# Patient Record
Sex: MALE | Race: WHITE | Employment: FULL TIME | URBAN - METROPOLITAN AREA
[De-identification: names, ages, dates, MRNs, and addresses within clinical notes are randomized per-mention and may not be internally consistent; named-entity substitution may affect disease eponyms.]

---

## 2017-05-18 DIAGNOSIS — F32.A ANXIETY AND DEPRESSION: ICD-10-CM

## 2017-05-18 DIAGNOSIS — F41.9 ANXIETY AND DEPRESSION: ICD-10-CM

## 2017-05-18 RX ORDER — VENLAFAXINE HYDROCHLORIDE 150 MG/1
CAPSULE, EXTENDED RELEASE ORAL
Qty: 90 CAP | Refills: 0 | Status: SHIPPED | COMMUNITY
Start: 2017-05-18 | End: 2017-08-16 | Stop reason: SDUPTHER

## 2020-01-06 ENCOUNTER — OFFICE VISIT (OUTPATIENT)
Dept: FAMILY MEDICINE CLINIC | Age: 45
End: 2020-01-06

## 2020-01-06 VITALS
OXYGEN SATURATION: 98 % | DIASTOLIC BLOOD PRESSURE: 100 MMHG | WEIGHT: 257 LBS | SYSTOLIC BLOOD PRESSURE: 164 MMHG | TEMPERATURE: 98.4 F | RESPIRATION RATE: 18 BRPM | BODY MASS INDEX: 34.06 KG/M2 | HEIGHT: 73 IN | HEART RATE: 118 BPM

## 2020-01-06 DIAGNOSIS — R03.0 ELEVATED BLOOD PRESSURE READING: Primary | ICD-10-CM

## 2020-01-06 DIAGNOSIS — Z13.6 SCREENING FOR CARDIOVASCULAR CONDITION: ICD-10-CM

## 2020-01-06 RX ORDER — QUETIAPINE FUMARATE 25 MG/1
TABLET, FILM COATED ORAL
COMMUNITY
Start: 2019-12-02

## 2020-01-06 RX ORDER — VENLAFAXINE HYDROCHLORIDE 75 MG/1
CAPSULE, EXTENDED RELEASE ORAL
COMMUNITY
Start: 2019-12-02

## 2020-01-06 RX ORDER — LAMOTRIGINE 100 MG/1
TABLET ORAL
COMMUNITY
Start: 2019-11-07

## 2020-01-06 NOTE — PROGRESS NOTES
Chief Complaint   Patient presents with    Well Male     1. Have you been to the ER, urgent care clinic since your last visit? Hospitalized since your last visit?no    2. Have you seen or consulted any other health care providers outside of the [address] [place] [address] since your last visit? Include any pap smears or colon screening.  no  Okay to delete medications no longer taking/therapy completed per verbal order Trenda Hatchet NP-C

## 2020-01-06 NOTE — PROGRESS NOTES
HISTORY OF PRESENT ILLNESS    Luis Armando Laws is a 45y.o. year old male who comes in today as a new patient to our practice to reestablish care. Patient states he has not been seen since by a primary care provider since 2016. HPI:   Patient reports he would like to have labs drawn. Comments has does have increase in work related stressors and also reports he has gained approx 15lbs over the holiday season. comments he and his wife are committed to changing their diet and bought healthier foods last week. Patient does attempt to incorporate physical activity throughout his day by walking his dog at least once during his work day. Current Outpatient Medications   Medication Sig Dispense Refill    lamoTRIgine (LAMICTAL) 100 mg tablet       QUEtiapine (SEROQUEL) 25 mg tablet       venlafaxine-SR (EFFEXOR-XR) 75 mg capsule       venlafaxine-SR (EFFEXOR-XR) 150 mg capsule TAKE 1 CAPSULE DAILY 90 Cap 1     Past Medical History:   Diagnosis Date    ADHD     Anxiety     Depression      Family History   Problem Relation Age of Onset    Cancer Father         melanoma    Thyroid Disease Sister      Social History     Socioeconomic History    Marital status: UNKNOWN     Spouse name: Not on file    Number of children: Not on file    Years of education: Not on file    Highest education level: Not on file   Tobacco Use    Smoking status: Never Smoker    Smokeless tobacco: Never Used   Substance and Sexual Activity    Alcohol use: Yes     Alcohol/week: 0.0 standard drinks    Drug use: No       ROS:  Review of Systems   Constitutional: Negative for chills and fever. Respiratory: Negative for shortness of breath. Cardiovascular: Negative for chest pain and palpitations. Neurological: Negative for dizziness and headaches.            Objective:  Visit Vitals  BP (!) 158/102 (BP 1 Location: Right arm, BP Patient Position: Sitting)   Pulse (!) 118   Temp 98.4 °F (36.9 °C) (Oral)   Resp 18   Ht 6' 1\" (1.854 m) Wt 257 lb (116.6 kg)   SpO2 98%   BMI 33.91 kg/m²     Physical Exam  Constitutional:       General: He is not in acute distress. Appearance: He is well-developed. He is not diaphoretic. HENT:      Head: Normocephalic and atraumatic. Neck:      Musculoskeletal: Normal range of motion and neck supple. Vascular: No carotid bruit. Cardiovascular:      Rate and Rhythm: Normal rate and regular rhythm. Heart sounds: Normal heart sounds. No murmur. No friction rub. No gallop. Pulmonary:      Effort: Pulmonary effort is normal.      Breath sounds: Normal breath sounds. No stridor. No wheezing or rales. Lymphadenopathy:      Cervical: No cervical adenopathy. Skin:     General: Skin is warm and dry. Neurological:      Mental Status: He is alert and oriented to person, place, and time. Assessment/Plan:     ICD-10-CM ICD-9-CM    1. Elevated blood pressure reading R03.0 796.2    2. Screening for cardiovascular condition S66.2 J71.3 METABOLIC PANEL, COMPREHENSIVE      LIPID PANEL      HEMOGLOBIN A1C WITH EAG     Orders Placed This Encounter    METABOLIC PANEL, COMPREHENSIVE    LIPID PANEL    HEMOGLOBIN A1C WITH EAG    lamoTRIgine (LAMICTAL) 100 mg tablet    QUEtiapine (SEROQUEL) 25 mg tablet    venlafaxine-SR (EFFEXOR-XR) 75 mg capsule       Advised of low sodium diet  I have discussed the diagnosis with the patient and the intended plan as seen in the above orders. The patient has received an after-visit summary and questions were answered concerning future plans. I have discussed medication side effects and warnings with the patient as well. Follow-up and Dispositions    · Return in about 2 weeks (around 1/20/2020) for elevated blood pressure.

## 2020-01-06 NOTE — PATIENT INSTRUCTIONS
Elevated Blood Pressure: Care Instructions  Your Care Instructions    Blood pressure is a measure of how hard the blood pushes against the walls of your arteries. It's normal for blood pressure to go up and down throughout the day. But if it stays up over time, you have high blood pressure. Two numbers tell you your blood pressure. The first number is the systolic pressure. It shows how hard the blood pushes when your heart is pumping. The second number is the diastolic pressure. It shows how hard the blood pushes between heartbeats, when your heart is relaxed and filling with blood. An ideal blood pressure in adults is less than 120/80 (say \"120 over 80\"). High blood pressure is 140/90 or higher. You have high blood pressure if your top number is 140 or higher or your bottom number is 90 or higher, or both. The main test for high blood pressure is simple, fast, and painless. To diagnose high blood pressure, your doctor will test your blood pressure at different times. After testing your blood pressure, your doctor may ask you to test it again when you are home. If you are diagnosed with high blood pressure, you can work with your doctor to make a long-term plan to manage it. Follow-up care is a key part of your treatment and safety. Be sure to make and go to all appointments, and call your doctor if you are having problems. It's also a good idea to know your test results and keep a list of the medicines you take. How can you care for yourself at home? · Do not smoke. Smoking increases your risk for heart attack and stroke. If you need help quitting, talk to your doctor about stop-smoking programs and medicines. These can increase your chances of quitting for good. · Stay at a healthy weight. · Try to limit how much sodium you eat to less than 2,300 milligrams (mg) a day. Your doctor may ask you to try to eat less than 1,500 mg a day. · Be physically active.  Get at least 30 minutes of exercise on most days of the week. Walking is a good choice. You also may want to do other activities, such as running, swimming, cycling, or playing tennis or team sports. · Avoid or limit alcohol. Talk to your doctor about whether you can drink any alcohol. · Eat plenty of fruits, vegetables, and low-fat dairy products. Eat less saturated and total fats. · Learn how to check your blood pressure at home. When should you call for help? Call your doctor now or seek immediate medical care if:  ? · Your blood pressure is much higher than normal (such as 180/110 or higher). ? · You think high blood pressure is causing symptoms such as:  ¨ Severe headache. ¨ Blurry vision. ? Watch closely for changes in your health, and be sure to contact your doctor if:  ? · You do not get better as expected. Where can you learn more? Go to http://michaelaSTATS Grouplakeshia.info/. Enter D905 in the search box to learn more about \"Elevated Blood Pressure: Care Instructions. \"  Current as of: September 21, 2016  Content Version: 11.4  © 4850-0166 [employer]. Care instructions adapted under license by [employer] (which disclaims liability or warranty for this information). If you have questions about a medical condition or this instruction, always ask your healthcare professional. [address] any warranty or liability for your use of this information. Low Sodium Diet (2,000 Milligram): Care Instructions  Your Care Instructions    Too much sodium causes your body to hold on to extra water. This can raise your blood pressure and force your heart and kidneys to work harder. In very serious cases, this could cause you to be put in the hospital. It might even be life-threatening. By limiting sodium, you will feel better and lower your risk of serious problems. The most common source of sodium is salt. People get most of the salt in their diet from canned, prepared, and packaged foods.  Fast food and restaurant meals also are very high in sodium. Your doctor will probably limit your sodium to less than 2,000 milligrams (mg) a day. This limit counts all the sodium in prepared and packaged foods and any salt you add to your food. Follow-up care is a key part of your treatment and safety. Be sure to make and go to all appointments, and call your doctor if you are having problems. It's also a good idea to know your test results and keep a list of the medicines you take. How can you care for yourself at home? Read food labels  · Read labels on cans and food packages. The labels tell you how much sodium is in each serving. Make sure that you look at the serving size. If you eat more than the serving size, you have eaten more sodium. · Food labels also tell you the Percent Daily Value for sodium. Choose products with low Percent Daily Values for sodium. · Be aware that sodium can come in forms other than salt, including monosodium glutamate (MSG), sodium citrate, and sodium bicarbonate (baking soda). MSG is often added to Asian food. When you eat out, you can sometimes ask for food without MSG or added salt. Buy low-sodium foods  · Buy foods that are labeled \"unsalted\" (no salt added), \"sodium-free\" (less than 5 mg of sodium per serving), or \"low-sodium\" (less than 140 mg of sodium per serving). Foods labeled \"reduced-sodium\" and \"light sodium\" may still have too much sodium. Be sure to read the label to see how much sodium you are getting. · Buy fresh vegetables, or frozen vegetables without added sauces. Buy low-sodium versions of canned vegetables, soups, and other canned goods. Prepare low-sodium meals  · Cut back on the amount of salt you use in cooking. This will help you adjust to the taste. Do not add salt after cooking. One teaspoon of salt has about 2,300 mg of sodium. · Take the salt shaker off the table. · Flavor your food with garlic, lemon juice, onion, vinegar, herbs, and spices.  Do not use soy sauce, lite soy sauce, steak sauce, onion salt, garlic salt, celery salt, mustard, or ketchup on your food. · Use low-sodium salad dressings, sauces, and ketchup. Or make your own salad dressings and sauces without adding salt. · Use less salt (or none) when recipes call for it. You can often use half the salt a recipe calls for without losing flavor. Other foods such as rice, pasta, and grains do not need added salt. · Rinse canned vegetables, and cook them in fresh water. This removes somebut not allof the salt. · Avoid water that is naturally high in sodium or that has been treated with water softeners, which add sodium. Call your local water company to find out the sodium content of your water supply. If you buy bottled water, read the label and choose a sodium-free brand. Avoid high-sodium foods  · Avoid eating:  ? Smoked, cured, salted, and canned meat, fish, and poultry. ? Ham, umana, hot dogs, and luncheon meats. ? Regular, hard, and processed cheese and regular peanut butter. ? Crackers with salted tops, and other salted snack foods such as pretzels, chips, and salted popcorn. ? Frozen prepared meals, unless labeled low-sodium. ? Canned and dried soups, broths, and bouillon, unless labeled sodium-free or low-sodium. ? Canned vegetables, unless labeled sodium-free or low-sodium. ? [place] fries, pizza, tacos, and other fast foods. ? Pickles, olives, ketchup, and other condiments, especially soy sauce, unless labeled sodium-free or low-sodium. Where can you learn more? Go to http://michaela-lakeshia.info/. Enter M626 in the search box to learn more about \"Low Sodium Diet (2,000 Milligram): Care Instructions. \"  Current as of: November 7, 2018  Content Version: 12.2  © 3518-3493 [employer]. Care instructions adapted under license by [employer] (which disclaims liability or warranty for this information).  If you have questions about a medical condition or this instruction, always ask your healthcare professional. [address] any warranty or liability for your use of this information. How to Read a Food Label to Limit Sodium: Care Instructions  Your Care Instructions  Sodium causes your body to hold on to extra water. This can raise your blood pressure and force your heart and kidneys to work harder. In very serious cases, this could cause you to be put in the hospital. It might even be life-threatening. By limiting sodium, you will feel better and lower your risk of serious problems. Processed foods, fast food, and restaurant foods are the major sources of dietary sodium. The most common name for sodium is salt. Try to limit how much sodium you eat to less than 2,300 milligrams (mg) a day. If you limit your sodium to 1,500 mg a day, you can lower your blood pressure even more. This limit counts all the salt that you eat in foods you cook or in packaged foods. Keep a list of everything you eat and drink. Follow-up care is a key part of your treatment and safety. Be sure to make and go to all appointments, and call your doctor if you are having problems. It's also a good idea to know your test results and keep a list of the medicines you take. How can you care for yourself at home? Read ingredient lists on food labels  · Read the list of ingredients on food labels to help you find how much sodium is in a food. The label lists the ingredients in a food in descending order (from the most to the least). If salt or sodium is high on the list, there may be a lot of sodium in the food. · Know that sodium has different names. Sodium is also called monosodium glutamate (MSG, common in [facility] food), sodium citrate, sodium alginate, sodium hydroxide, and sodium phosphate. Read Nutrition Facts labels  · On most foods, there is a Nutrition Facts label. This will tell you how much sodium is in one serving of food.  Look at both the serving size and the sodium amount. The serving size is located at the top of the label, usually right under the \"Nutrition Facts\" title. The amount of sodium is given in the list under the title. It is given in milligrams (mg). ? Check the serving size carefully. A single serving is often very small, and you may eat more than one serving. If this is the case, you will eat more sodium than listed on the label. For example, if the serving size for a canned soup is 1 cup and the sodium amount is 470 mg, if you have 2 cups you will eat 940 mg of sodium. · The nutrition facts for fresh fruits and vegetables are not listed on the food. They may be listed somewhere in the store. These foods usually have no sodium or low sodium. · The Nutrition Facts label also gives you the Percent Daily Value for sodium. This is how much of the recommended amount of sodium a serving contains. The daily value for sodium is less than 2,300 mg. So if the Percent Daily Value says 50%, this means one serving is giving you half of this, or 1,150 mg. Buy low-sodium foods  · Look for foods that are made with less sodium. Watch for the following words on the label. ? \"Unsalted\" means there is no sodium added to the food. But there may be sodium already in the food naturally. ? \"Sodium-free\" means a serving has less than 5 mg of sodium. ? \"Very low sodium\" means a serving has 35 mg or less of sodium. ? \"Low-sodium\" means a serving has 140 mg or less of sodium. · \"Reduced-sodium\" means that there is 25% less sodium than what the food normally has. This is still usually too much sodium. Try not to buy foods with this on the label. · Buy fresh vegetables, or frozen vegetables without added sauces. Buy low-sodium versions of canned vegetables, soups, and other canned goods. Where can you learn more? Go to http://antione.info/.   Enter 26 [id number] in the search box to learn more about \"How to Read a Food Label to Limit Sodium: Care Instructions. \"  Current as of: November 7, 2018  Content Version: 12.2  © 9203-1789 [employer]. Care instructions adapted under license by [employer] (which disclaims liability or warranty for this information). If you have questions about a medical condition or this instruction, always ask your healthcare professional. [address] any warranty or liability for your use of this information.

## 2020-01-07 ENCOUNTER — HOSPITAL ENCOUNTER (OUTPATIENT)
Dept: LAB | Age: 45
Discharge: HOME | End: 2020-01-07
Payer: COMMERCIAL

## 2020-01-07 DIAGNOSIS — Z13.6 SCREENING FOR CARDIOVASCULAR CONDITION: ICD-10-CM

## 2020-01-07 LAB
ALBUMIN SERPL-MCNC: 3.8 G/DL (ref 3.4–5)
ALBUMIN/GLOB SERPL: 1.1 {RATIO} (ref 0.8–1.7)
ALP SERPL-CCNC: 72 U/L (ref 45–117)
ALT SERPL-CCNC: 66 U/L (ref 16–61)
ANION GAP SERPL CALC-SCNC: 5 MMOL/L (ref 3–18)
AST SERPL-CCNC: 44 U/L (ref 10–38)
BILIRUB SERPL-MCNC: 0.4 MG/DL (ref 0.2–1)
BUN SERPL-MCNC: 11 MG/DL (ref 7–18)
BUN/CREAT SERPL: 13 (ref 12–20)
CALCIUM SERPL-MCNC: 9 MG/DL (ref 8.5–10.1)
CHLORIDE SERPL-SCNC: 105 MMOL/L (ref 100–111)
CHOLEST SERPL-MCNC: 268 MG/DL
CO2 SERPL-SCNC: 28 MMOL/L (ref 21–32)
CREAT SERPL-MCNC: 0.88 MG/DL (ref 0.6–1.3)
EST. AVERAGE GLUCOSE BLD GHB EST-MCNC: 97 MG/DL
GLOBULIN SER CALC-MCNC: 3.6 G/DL (ref 2–4)
GLUCOSE SERPL-MCNC: 93 MG/DL (ref 74–99)
HBA1C MFR BLD: 5 % (ref 4.2–5.6)
HDLC SERPL-MCNC: 49 MG/DL (ref 40–60)
HDLC SERPL: 5.5 {RATIO} (ref 0–5)
LDLC SERPL CALC-MCNC: 160.2 MG/DL (ref 0–100)
LIPID PROFILE,FLP: ABNORMAL
POTASSIUM SERPL-SCNC: 4.1 MMOL/L (ref 3.5–5.5)
PROT SERPL-MCNC: 7.4 G/DL (ref 6.4–8.2)
SODIUM SERPL-SCNC: 138 MMOL/L (ref 136–145)
TRIGL SERPL-MCNC: 294 MG/DL (ref ?–150)
VLDLC SERPL CALC-MCNC: 58.8 MG/DL

## 2020-01-07 PROCEDURE — 83036 HEMOGLOBIN GLYCOSYLATED A1C: CPT

## 2020-01-07 PROCEDURE — 80053 COMPREHEN METABOLIC PANEL: CPT

## 2020-01-07 PROCEDURE — 36415 COLL VENOUS BLD VENIPUNCTURE: CPT

## 2020-01-07 PROCEDURE — 80061 LIPID PANEL: CPT

## 2020-01-23 ENCOUNTER — OFFICE VISIT (OUTPATIENT)
Dept: FAMILY MEDICINE CLINIC | Age: 45
End: 2020-01-23

## 2020-01-23 VITALS
BODY MASS INDEX: 34.33 KG/M2 | DIASTOLIC BLOOD PRESSURE: 100 MMHG | TEMPERATURE: 97.8 F | OXYGEN SATURATION: 98 % | WEIGHT: 259 LBS | RESPIRATION RATE: 18 BRPM | HEIGHT: 73 IN | SYSTOLIC BLOOD PRESSURE: 158 MMHG | HEART RATE: 110 BPM

## 2020-01-23 DIAGNOSIS — R79.89 ELEVATED LFTS: ICD-10-CM

## 2020-01-23 DIAGNOSIS — E78.00 PURE HYPERCHOLESTEROLEMIA: ICD-10-CM

## 2020-01-23 DIAGNOSIS — I10 ESSENTIAL HYPERTENSION: Primary | ICD-10-CM

## 2020-01-23 RX ORDER — PROPRANOLOL HYDROCHLORIDE 60 MG/1
60 CAPSULE, EXTENDED RELEASE ORAL DAILY
Qty: 30 CAP | Refills: 1 | Status: SHIPPED | OUTPATIENT
Start: 2020-01-23 | End: 2020-07-23 | Stop reason: SDUPTHER

## 2020-01-23 RX ORDER — ATORVASTATIN CALCIUM 20 MG/1
20 TABLET, FILM COATED ORAL
Qty: 30 TAB | Refills: 2 | Status: SHIPPED | OUTPATIENT
Start: 2020-01-23 | End: 2020-03-06 | Stop reason: SDUPTHER

## 2020-01-23 NOTE — PROGRESS NOTES
HISTORY OF PRESENT ILLNESS  Margi Mckeon is a 45 y.o. male presents today to follow up elevated blood pressure and review and discuss recent labs  Patient presents today to follow up for elevated blood pressure. States he has continued with recent lifestyle changes. Reports he also has been attempting to increase his activity since his last visit. However, he does report increase stressors since his last visit when he was informed he was being laid off form his job. No Known Allergies  Current Outpatient Medications   Medication Sig Dispense Refill    lamoTRIgine (LAMICTAL) 100 mg tablet       QUEtiapine (SEROQUEL) 25 mg tablet       venlafaxine-SR (EFFEXOR-XR) 75 mg capsule       venlafaxine-SR (EFFEXOR-XR) 150 mg capsule TAKE 1 CAPSULE DAILY 90 Cap 1     Past Medical History:   Diagnosis Date    ADHD     Anxiety     Depression      Social History     Socioeconomic History    Marital status:      Spouse name: Not on file    Number of children: Not on file    Years of education: Not on file    Highest education level: Not on file   Occupational History    Not on file   Social Needs    Financial resource strain: Not on file    Food insecurity:     Worry: Not on file     Inability: Not on file    Transportation needs:     Medical: Not on file     Non-medical: Not on file   Tobacco Use    Smoking status: Never Smoker    Smokeless tobacco: Never Used   Substance and Sexual Activity    Alcohol use:  Yes     Alcohol/week: 0.0 standard drinks    Drug use: No    Sexual activity: Yes     Partners: Female     Birth control/protection: Surgical   Lifestyle    Physical activity:     Days per week: Not on file     Minutes per session: Not on file    Stress: Not on file   Relationships    Social connections:     Talks on phone: Not on file     Gets together: Not on file     Attends Rastafarian service: Not on file     Active member of club or organization: Not on file     Attends meetings of clubs or organizations: Not on file     Relationship status: Not on file    Intimate partner violence:     Fear of current or ex partner: Not on file     Emotionally abused: Not on file     Physically abused: Not on file     Forced sexual activity: Not on file   Other Topics Concern    Not on file   Social History Narrative    Not on file     Wt Readings from Last 3 Encounters:   01/23/20 259 lb (117.5 kg)   01/06/20 257 lb (116.6 kg)   07/10/17 260 lb (117.9 kg)     BP Readings from Last 3 Encounters:   01/23/20 (!) 141/102   01/06/20 (!) 164/100   07/10/17 132/78     Review of Systems   Constitutional: Negative for chills and fever. Respiratory: Negative for shortness of breath. Cardiovascular: Negative for chest pain and palpitations. Neurological: Negative for dizziness and headaches. BP (!) 141/102 (BP 1 Location: Right arm, BP Patient Position: Sitting)   Pulse (!) 110   Temp 97.8 °F (36.6 °C) (Oral)   Resp 18   Ht 6' 1\" (1.854 m)   Wt 259 lb (117.5 kg)   SpO2 98%   BMI 34.17 kg/m²     Physical Exam  Constitutional:       Appearance: He is well-developed. HENT:      Head: Normocephalic and atraumatic. Neck:      Musculoskeletal: Normal range of motion and neck supple. Cardiovascular:      Rate and Rhythm: Normal rate and regular rhythm. Heart sounds: Normal heart sounds. No murmur. No friction rub. No gallop. Pulmonary:      Effort: Pulmonary effort is normal.      Breath sounds: Normal breath sounds. No wheezing, rhonchi or rales. Skin:     General: Skin is warm and dry. Neurological:      Mental Status: He is alert and oriented to person, place, and time. ASSESSMENT and PLAN    ICD-10-CM ICD-9-CM    1. Essential hypertension I10 401.9    2. Elevated LFTs R94.5 790.6 US ABD LTD   3.  Pure hypercholesterolemia E78.00 272.0      Orders Placed This Encounter    US ABD LTD    atorvastatin (LIPITOR) 20 mg tablet    propranolol LA (INDERAL LA) 60 mg SR capsule I have discussed the diagnosis with the patient and the intended plan as seen in the above orders. The patient has received an after-visit summary and questions were answered concerning future plans. I have discussed medication side effects and warnings with the patient as well. Patient agreeable with above plan and verbalizes understanding. Follow-up and Dispositions    · Return in about 4 weeks (around 2/20/2020) for HTN/HLD.

## 2020-02-06 ENCOUNTER — HOSPITAL ENCOUNTER (OUTPATIENT)
Dept: ULTRASOUND IMAGING | Age: 45
Discharge: HOME | End: 2020-02-06
Attending: NURSE PRACTITIONER
Payer: COMMERCIAL

## 2020-02-06 DIAGNOSIS — R79.89 ELEVATED LFTS: ICD-10-CM

## 2020-02-06 PROCEDURE — 76705 ECHO EXAM OF ABDOMEN: CPT

## 2020-02-20 ENCOUNTER — OFFICE VISIT (OUTPATIENT)
Dept: ORTHOPEDIC SURGERY | Age: 45
End: 2020-02-20

## 2020-02-20 ENCOUNTER — HOSPITAL ENCOUNTER (OUTPATIENT)
Dept: OCCUPATIONAL MEDICINE | Age: 45
Discharge: HOME | End: 2020-02-20
Attending: FAMILY MEDICINE

## 2020-02-20 VITALS
BODY MASS INDEX: 34.19 KG/M2 | WEIGHT: 258 LBS | RESPIRATION RATE: 15 BRPM | HEIGHT: 73 IN | DIASTOLIC BLOOD PRESSURE: 85 MMHG | SYSTOLIC BLOOD PRESSURE: 131 MMHG | HEART RATE: 68 BPM | TEMPERATURE: 97 F

## 2020-02-20 DIAGNOSIS — S83.002A SUBLUXATION OF LEFT PATELLA, INITIAL ENCOUNTER: Primary | ICD-10-CM

## 2020-02-20 DIAGNOSIS — S83.002A SUBLUXATION OF LEFT PATELLA, INITIAL ENCOUNTER: ICD-10-CM

## 2020-02-20 RX ORDER — MELOXICAM 15 MG/1
TABLET ORAL
Qty: 90 TAB | Refills: 0 | Status: SHIPPED | OUTPATIENT
Start: 2020-02-20 | End: 2020-07-23

## 2020-02-20 NOTE — PROGRESS NOTES
HISTORY OF PRESENT ILLNESS    Caren Powell is a 45y.o. year old male comes in today as new patient for: left knee pain    Patients symptoms have been present for 3 weeks after fell off a 6 foot ladder. Pain level 5/10 lateral-anterior. It has improved with time. Patient has tried:  rest with benefit. It is described as pain in knee and had been swollen bad prior but much improved since. .    IMAGING: XR left knee today pending. Past Surgical History:   Procedure Laterality Date    HX VASECTOMY      HX WISDOM TEETH EXTRACTION       Social History     Socioeconomic History    Marital status:      Spouse name: Not on file    Number of children: Not on file    Years of education: Not on file    Highest education level: Not on file   Tobacco Use    Smoking status: Never Smoker    Smokeless tobacco: Never Used   Substance and Sexual Activity    Alcohol use: Yes     Alcohol/week: 0.0 standard drinks    Drug use: No    Sexual activity: Yes     Partners: Female     Birth control/protection: Surgical      Current Outpatient Medications   Medication Sig Dispense Refill    atorvastatin (LIPITOR) 20 mg tablet Take 1 Tab by mouth nightly. 30 Tab 2    propranolol LA (INDERAL LA) 60 mg SR capsule Take 1 Cap by mouth daily. 30 Cap 1    lamoTRIgine (LAMICTAL) 100 mg tablet       QUEtiapine (SEROQUEL) 25 mg tablet       venlafaxine-SR (EFFEXOR-XR) 75 mg capsule       venlafaxine-SR (EFFEXOR-XR) 150 mg capsule TAKE 1 CAPSULE DAILY 90 Cap 1     Past Medical History:   Diagnosis Date    ADHD     Anxiety     Depression      Family History   Problem Relation Age of Onset    Cancer Father         melanoma    Heart Disease Father     High Cholesterol Father     Thyroid Disease Sister     Bipolar Disorder Brother         manic/depression    Depression Sister     Depression Brother     Depression Brother          ROS:  No numb.   All other systems reviewed and negative aside from that written in the HPI.      Objective:  /85   Pulse 68   Temp 97 °F (36.1 °C)   Resp 15   Ht 6' 1\" (1.854 m)   Wt 258 lb (117 kg)   BMI 34.04 kg/m²   GEN: Appears stated age in NAD. HEAD:  Normocephalic, atraumatic. NEURO:  Sensation intact light touch B/L lower extremities. MS:  LE Strength +5/5 bilateral .   left Knee:  2+ Effusion . Lachman negative. Valgus negative. Varus negative. negative joint line tenderness medial.  Radha negative. Posterior drawer negative. Noble compression test negative. Patellar apprehension minimal.  Patellar facet  negative tender to palpation lateral mild crepitance bilateral .  Pes anserine negative TTP bilateral   EXT: no clubbing/cyanosis. no edema. SKIN: Warm/dry without rash. HEENT: Conjunctiva/lids WNL. External canals/nares WNL. Tongue midline. PERRL, EOMI. Hearing intact. NECK: Trachea midline. Supple, Full ROM. No thyromegaly. CARDIAC: No edema. LUNGS: Normal effort. ABD: Soft, no masses. No HSM. PSYCH: A+O x3. Appropriate judgment and insight. Assessment/Plan:     ICD-10-CM ICD-9-CM    1. Subluxation of left patella, initial encounter S83.002A 836.3 XR KNEE LT MAX 2 VWS      meloxicam (MOBIC) 15 mg tablet       Patient (or guardian if minor) verbalizes understanding of evaluation and plan. Sx seem due to mild patellar subluxation so will demo HEP for PFS to perform B/L and plan RTC 6 weeks.

## 2020-02-20 NOTE — PATIENT INSTRUCTIONS
Perform exercises daily, use medication as prescribed, and return to the office in about 6 weeks.      Search YouTube for my channel:    Dr. Tracy Parra

## 2020-03-06 ENCOUNTER — OFFICE VISIT (OUTPATIENT)
Dept: FAMILY MEDICINE CLINIC | Age: 45
End: 2020-03-06

## 2020-03-06 VITALS
SYSTOLIC BLOOD PRESSURE: 134 MMHG | HEIGHT: 73 IN | RESPIRATION RATE: 18 BRPM | WEIGHT: 251 LBS | HEART RATE: 81 BPM | TEMPERATURE: 98.2 F | BODY MASS INDEX: 33.27 KG/M2 | OXYGEN SATURATION: 98 % | DIASTOLIC BLOOD PRESSURE: 82 MMHG

## 2020-03-06 DIAGNOSIS — I10 ESSENTIAL HYPERTENSION: Primary | ICD-10-CM

## 2020-03-06 DIAGNOSIS — E78.00 PURE HYPERCHOLESTEROLEMIA: ICD-10-CM

## 2020-03-06 RX ORDER — ATORVASTATIN CALCIUM 20 MG/1
20 TABLET, FILM COATED ORAL
Qty: 30 TAB | Refills: 2 | Status: SHIPPED | OUTPATIENT
Start: 2020-03-06 | End: 2020-07-23 | Stop reason: SDUPTHER

## 2020-03-06 NOTE — PROGRESS NOTES
Chief Complaint   Patient presents with    Hypertension    Cholesterol Problem     1. Have you been to the ER, urgent care clinic since your last visit? Hospitalized since your last visit?no    2. Have you seen or consulted any other health care providers outside of the [address] [place] [address] since your last visit? Include any pap smears or colon screening.  no

## 2020-03-06 NOTE — PROGRESS NOTES
Subjective:   Macie Kent is a 45 y.o. male with hypertension presents for 4 weeks follow up since beginning propranolol and atorvastatin. Reports he ran out of medication approx. 1-2 weeks ago. Comments he didn't think he was suppose to continue. Patient Active Problem List   Diagnosis Code    Anxiety and depression F41.9, F32.9     Current Outpatient Medications   Medication Sig Dispense Refill    meloxicam (MOBIC) 15 mg tablet Take 1 tab daily as needed pain with food. 90 Tab 0    atorvastatin (LIPITOR) 20 mg tablet Take 1 Tab by mouth nightly. 30 Tab 2    propranolol LA (INDERAL LA) 60 mg SR capsule Take 1 Cap by mouth daily.  30 Cap 1    lamoTRIgine (LAMICTAL) 100 mg tablet       QUEtiapine (SEROQUEL) 25 mg tablet       venlafaxine-SR (EFFEXOR-XR) 75 mg capsule       venlafaxine-SR (EFFEXOR-XR) 150 mg capsule TAKE 1 CAPSULE DAILY 90 Cap 1      No Known Allergies  Past Surgical History:   Procedure Laterality Date    HX VASECTOMY      HX WISDOM TEETH EXTRACTION       Family History   Problem Relation Age of Onset    Cancer Father         melanoma    Heart Disease Father     High Cholesterol Father     Thyroid Disease Sister     Bipolar Disorder Brother         manic/depression    Depression Sister     Depression Brother     Depression Brother        Lab Results   Component Value Date/Time    Cholesterol, total 268 (H) 01/07/2020 08:15 AM    HDL Cholesterol 49 01/07/2020 08:15 AM    LDL, calculated 160.2 (H) 01/07/2020 08:15 AM    VLDL, calculated 58.8 01/07/2020 08:15 AM    Triglyceride 294 (H) 01/07/2020 08:15 AM    CHOL/HDL Ratio 5.5 (H) 01/07/2020 08:15 AM     Lab Results   Component Value Date/Time    Sodium 138 01/07/2020 08:15 AM    Potassium 4.1 01/07/2020 08:15 AM    Chloride 105 01/07/2020 08:15 AM    CO2 28 01/07/2020 08:15 AM    Anion gap 5 01/07/2020 08:15 AM    Glucose 93 01/07/2020 08:15 AM    BUN 11 01/07/2020 08:15 AM    Creatinine 0.88 01/07/2020 08:15 AM BUN/Creatinine ratio 13 01/07/2020 08:15 AM    GFR est AA >60 01/07/2020 08:15 AM    GFR est non-AA >60 01/07/2020 08:15 AM    Calcium 9.0 01/07/2020 08:15 AM    Bilirubin, total 0.4 01/07/2020 08:15 AM    AST (SGOT) 44 (H) 01/07/2020 08:15 AM    Alk. phosphatase 72 01/07/2020 08:15 AM    Protein, total 7.4 01/07/2020 08:15 AM    Albumin 3.8 01/07/2020 08:15 AM    Globulin 3.6 01/07/2020 08:15 AM    A-G Ratio 1.1 01/07/2020 08:15 AM    ALT (SGPT) 66 (H) 01/07/2020 08:15 AM     Lab Results   Component Value Date/Time    Hemoglobin A1c 5.0 01/07/2020 08:15 AM     Wt Readings from Last 3 Encounters:   02/20/20 258 lb (117 kg)   01/23/20 259 lb (117.5 kg)   01/06/20 257 lb (116.6 kg)     BP Readings from Last 3 Encounters:   02/20/20 131/85   01/23/20 (!) 158/100   01/06/20 (!) 164/100       Hypertension ROS: not taking medications regularly as instructed, no medication side effects noted, no TIA's, no chest pain on exertion, no dyspnea on exertion, no swelling of ankles. New concerns: none. Objective:   Visit Vitals  /82 (BP 1 Location: Right arm, BP Patient Position: Sitting)   Pulse 81   Temp 98.2 °F (36.8 °C) (Oral)   Resp 18   Ht 6' 1\" (1.854 m)   Wt 251 lb (113.9 kg)   SpO2 98%   BMI 33.12 kg/m²      General appearance - alert, well appearing, and in no distress  Neck - supple, no significant adenopathy, carotids upstroke normal bilaterally, no bruits  Chest - clear to auscultation, no wheezes, rales or rhonchi, symmetric air entry  Heart - normal rate, regular rhythm, normal S1, S2, no murmurs, rubs, clicks or gallops  Extremities - peripheral pulses normal, no pedal edema, no clubbing or cyanosis          Assessment/Plan:      ICD-10-CM ICD-9-CM    1. Essential hypertension I10 401.9 LIPID PANEL      METABOLIC PANEL, COMPREHENSIVE   2.  Pure hypercholesterolemia E78.00 272.0 LIPID PANEL      METABOLIC PANEL, COMPREHENSIVE       Informed will continue lipitor, refill sent to the pharmacy  Advised due to weight loss, lifestyle changes may stop the inderal for now and will monitor bp closely  reviewed diet, exercise and weight control. I have discussed the diagnosis with the patient and the intended plan as seen in the above orders. The patient has received an after-visit summary and questions were answered concerning future plans. I have discussed medication side effects and warnings with the patient as well. Patient agreeable with above plan and verbalizes understanding. Follow-up and Dispositions    · Return in about 3 months (around 6/6/2020) for HTN/HLD with fasting labs 1 week prior.

## 2020-07-23 ENCOUNTER — VIRTUAL VISIT (OUTPATIENT)
Dept: FAMILY MEDICINE CLINIC | Age: 45
End: 2020-07-23

## 2020-07-23 DIAGNOSIS — E78.00 PURE HYPERCHOLESTEROLEMIA: ICD-10-CM

## 2020-07-23 DIAGNOSIS — M10.9 GOUT, UNSPECIFIED CAUSE, UNSPECIFIED CHRONICITY, UNSPECIFIED SITE: ICD-10-CM

## 2020-07-23 DIAGNOSIS — I10 ESSENTIAL HYPERTENSION: Primary | ICD-10-CM

## 2020-07-27 RX ORDER — PROPRANOLOL HYDROCHLORIDE 60 MG/1
60 CAPSULE, EXTENDED RELEASE ORAL DAILY
Qty: 30 CAP | Refills: 1 | Status: SHIPPED | OUTPATIENT
Start: 2020-07-27

## 2020-07-27 RX ORDER — COLCHICINE 0.6 MG/1
TABLET ORAL
Qty: 10 TAB | Refills: 0 | Status: SHIPPED | OUTPATIENT
Start: 2020-07-27

## 2020-07-27 RX ORDER — ATORVASTATIN CALCIUM 20 MG/1
20 TABLET, FILM COATED ORAL
Qty: 30 TAB | Refills: 2 | Status: SHIPPED | OUTPATIENT
Start: 2020-07-27

## 2020-07-27 NOTE — PROGRESS NOTES
Doroteo Luis is a 45 y.o. male who was seen by synchronous (real-time) audio-video technology on 7/23/2020 for Hypertension and Cholesterol Problem    Assessment & Plan:   Diagnoses and all orders for this visit:    1. Essential hypertension  -     LIPID PANEL; Future  -     METABOLIC PANEL, COMPREHENSIVE; Future    2. Pure hypercholesterolemia  -     LIPID PANEL; Future  -     METABOLIC PANEL, COMPREHENSIVE; Future    3. Gout, unspecified cause, unspecified chronicity, unspecified site  -     URIC ACID; Future    Other orders  -     colchicine 0.6 mg tablet; Take 2 tabs at first sign of flare then 1 tab 1hr later. Not to exceed 3 tabs in 1hr period  -     atorvastatin (LIPITOR) 20 mg tablet; Take 1 Tab by mouth nightly. -     propranolol LA (INDERAL LA) 60 mg SR capsule; Take 1 Cap by mouth daily. I spent at least 15 minutes on this visit with this established patient. [id number]  Subjective:   Hypertension ROS: no TIA's, no chest pain on exertion, no dyspnea on exertion, no swelling of ankles. States he has not been taking propanolol and also ran out of lipitor  States recently had a gout attack in right knee radiating down to right foot. Comments this has been occurring the last 3 years intermittently. States he is currently in [address] [place] [address] and went to local clinic and given medication to take 3 times per day for approx. 1 week. Doesn't recall the name of the medication. States medication was not effective. Reports he experience better pain relief from tylenol. Prior to Admission medications    Medication Sig Start Date End Date Taking? Authorizing Provider   colchicine 0.6 mg tablet Take 2 tabs at first sign of flare then 1 tab 1hr later. Not to exceed 3 tabs in 1hr period 7/27/20  Yes Jarrett Curry NP   atorvastatin (LIPITOR) 20 mg tablet Take 1 Tab by mouth nightly. 7/27/20  Yes Jarrett Curry NP   propranolol LA (INDERAL LA) 60 mg SR capsule Take 1 Cap by mouth daily.  7/27/20  Yes Jarrett Curry NP lamoTRIgine (LAMICTAL) 100 mg tablet  11/7/19  Yes Provider, Historical   QUEtiapine (SEROQUEL) 25 mg tablet  12/2/19  Yes Provider, Historical   venlafaxine-SR (EFFEXOR-XR) 75 mg capsule  12/2/19  Yes Provider, Historical   venlafaxine-SR (EFFEXOR-XR) 150 mg capsule TAKE 1 CAPSULE DAILY 8/16/17  Yes Bhavesh Mercado DO     Patient Active Problem List   Diagnosis Code    Anxiety and depression F41.9, F32.9     Patient Active Problem List    Diagnosis Date Noted    Anxiety and depression 01/16/2015     Current Outpatient Medications   Medication Sig Dispense Refill    colchicine 0.6 mg tablet Take 2 tabs at first sign of flare then 1 tab 1hr later. Not to exceed 3 tabs in 1hr period 10 Tab 0    atorvastatin (LIPITOR) 20 mg tablet Take 1 Tab by mouth nightly. 30 Tab 2    propranolol LA (INDERAL LA) 60 mg SR capsule Take 1 Cap by mouth daily. 30 Cap 1    lamoTRIgine (LAMICTAL) 100 mg tablet       QUEtiapine (SEROQUEL) 25 mg tablet       venlafaxine-SR (EFFEXOR-XR) 75 mg capsule       venlafaxine-SR (EFFEXOR-XR) 150 mg capsule TAKE 1 CAPSULE DAILY 90 Cap 1     No Known Allergies  Past Medical History:   Diagnosis Date    ADHD     Anxiety     Depression      Past Surgical History:   Procedure Laterality Date    HX VASECTOMY     Priscilladominick MelendezGallardo WISDOM TEETH EXTRACTION       Family History   Problem Relation Age of Onset    Cancer Father         melanoma    Heart Disease Father     High Cholesterol Father     Thyroid Disease Sister     Bipolar Disorder Brother         manic/depression    Depression Sister     Depression Brother     Depression Brother      Social History     Tobacco Use    Smoking status: Never Smoker    Smokeless tobacco: Never Used   Substance Use Topics    Alcohol use: Yes     Alcohol/week: 0.0 standard drinks       Objective:   No flowsheet data found.    General: alert, cooperative, no distress   Mental  status: normal mood, behavior, speech, dress, motor activity, and thought processes, able to follow commands   HENT: NCAT   Neck: no visualized mass   Resp: no respiratory distress   Neuro: no gross deficits   Skin: no discoloration or lesions of concern on visible areas   Psychiatric: normal affect, consistent with stated mood, no evidence of hallucinations     Additional exam findings: We discussed the expected course, resolution and complications of the diagnosis(es) in detail. Medication risks, benefits, costs, interactions, and alternatives were discussed as indicated. I advised him to contact the office if his condition worsens, changes or fails to improve as anticipated. He expressed understanding with the diagnosis(es) and plan. Liz Babin, who was evaluated through a patient-initiated, synchronous (real-time) audio-video encounter, and/or his healthcare decision maker, is aware that it is a billable service, with coverage as determined by his insurance carrier. He provided verbal consent to proceed: Yes, and patient identification was verified. It was conducted pursuant to the emergency declaration under the [address] [place] [address], [address] [place] [address] authority and the [employer] and [employer] Act. A caregiver was present when appropriate. Ability to conduct physical exam was limited. I was in the office. The patient was at home.       Hilaria Jordan NP

## 2023-05-17 RX ORDER — COLCHICINE 0.6 MG/1
TABLET ORAL
COMMUNITY
Start: 2020-07-27

## 2023-05-17 RX ORDER — VENLAFAXINE HYDROCHLORIDE 75 MG/1
CAPSULE, EXTENDED RELEASE ORAL
COMMUNITY
Start: 2019-12-02

## 2023-05-17 RX ORDER — PROPRANOLOL HCL 60 MG
60 CAPSULE, EXTENDED RELEASE 24HR ORAL DAILY
COMMUNITY
Start: 2020-07-27

## 2023-05-17 RX ORDER — LAMOTRIGINE 100 MG/1
TABLET ORAL
COMMUNITY
Start: 2019-11-07

## 2023-05-17 RX ORDER — VENLAFAXINE HYDROCHLORIDE 150 MG/1
1 CAPSULE, EXTENDED RELEASE ORAL DAILY
COMMUNITY
Start: 2017-08-16

## 2023-05-17 RX ORDER — QUETIAPINE FUMARATE 25 MG/1
TABLET, FILM COATED ORAL
COMMUNITY
Start: 2019-12-02

## 2023-05-17 RX ORDER — ATORVASTATIN CALCIUM 20 MG/1
20 TABLET, FILM COATED ORAL
COMMUNITY
Start: 2020-07-27